# Patient Record
Sex: FEMALE | Race: WHITE | HISPANIC OR LATINO | URBAN - METROPOLITAN AREA
[De-identification: names, ages, dates, MRNs, and addresses within clinical notes are randomized per-mention and may not be internally consistent; named-entity substitution may affect disease eponyms.]

---

## 2024-05-20 ENCOUNTER — EMERGENCY (EMERGENCY)
Facility: HOSPITAL | Age: 34
LOS: 1 days | Discharge: HOME | End: 2024-05-20
Attending: EMERGENCY MEDICINE
Payer: SELF-PAY

## 2024-05-20 VITALS
OXYGEN SATURATION: 97 % | WEIGHT: 160.06 LBS | DIASTOLIC BLOOD PRESSURE: 97 MMHG | SYSTOLIC BLOOD PRESSURE: 140 MMHG | TEMPERATURE: 99 F | HEIGHT: 61 IN | RESPIRATION RATE: 17 BRPM | HEART RATE: 112 BPM

## 2024-05-20 LAB
ALBUMIN SERPL ELPH-MCNC: 4.4 G/DL — SIGNIFICANT CHANGE UP (ref 3.3–5)
ALP SERPL-CCNC: 101 U/L — SIGNIFICANT CHANGE UP (ref 40–120)
ALT FLD-CCNC: 39 U/L — SIGNIFICANT CHANGE UP (ref 10–45)
ANION GAP SERPL CALC-SCNC: 18 MMOL/L — HIGH (ref 5–17)
ANION GAP SERPL CALC-SCNC: 20 MMOL/L — HIGH (ref 5–17)
APTT BLD: 28.2 SEC — SIGNIFICANT CHANGE UP (ref 24.5–35.6)
AST SERPL-CCNC: 24 U/L — SIGNIFICANT CHANGE UP (ref 10–40)
BASOPHILS # BLD AUTO: 0.06 K/UL — SIGNIFICANT CHANGE UP (ref 0–0.2)
BASOPHILS NFR BLD AUTO: 0.6 % — SIGNIFICANT CHANGE UP (ref 0–2)
BILIRUB SERPL-MCNC: 0.5 MG/DL — SIGNIFICANT CHANGE UP (ref 0.2–1.2)
BUN SERPL-MCNC: 8 MG/DL — SIGNIFICANT CHANGE UP (ref 7–23)
BUN SERPL-MCNC: 9 MG/DL — SIGNIFICANT CHANGE UP (ref 7–23)
CALCIUM SERPL-MCNC: 9.5 MG/DL — SIGNIFICANT CHANGE UP (ref 8.4–10.5)
CALCIUM SERPL-MCNC: 9.7 MG/DL — SIGNIFICANT CHANGE UP (ref 8.4–10.5)
CHLORIDE SERPL-SCNC: 100 MMOL/L — SIGNIFICANT CHANGE UP (ref 96–108)
CHLORIDE SERPL-SCNC: 100 MMOL/L — SIGNIFICANT CHANGE UP (ref 96–108)
CO2 SERPL-SCNC: 18 MMOL/L — LOW (ref 22–31)
CO2 SERPL-SCNC: 18 MMOL/L — LOW (ref 22–31)
CREAT SERPL-MCNC: 0.71 MG/DL — SIGNIFICANT CHANGE UP (ref 0.5–1.3)
CREAT SERPL-MCNC: 0.73 MG/DL — SIGNIFICANT CHANGE UP (ref 0.5–1.3)
EGFR: 111 ML/MIN/1.73M2 — SIGNIFICANT CHANGE UP
EGFR: 114 ML/MIN/1.73M2 — SIGNIFICANT CHANGE UP
EOSINOPHIL # BLD AUTO: 0.03 K/UL — SIGNIFICANT CHANGE UP (ref 0–0.5)
EOSINOPHIL NFR BLD AUTO: 0.3 % — SIGNIFICANT CHANGE UP (ref 0–6)
GLUCOSE SERPL-MCNC: 101 MG/DL — HIGH (ref 70–99)
GLUCOSE SERPL-MCNC: 108 MG/DL — HIGH (ref 70–99)
HCG SERPL-ACNC: <2 MIU/ML — SIGNIFICANT CHANGE UP
HCT VFR BLD CALC: 40.9 % — SIGNIFICANT CHANGE UP (ref 34.5–45)
HGB BLD-MCNC: 13.5 G/DL — SIGNIFICANT CHANGE UP (ref 11.5–15.5)
IMM GRANULOCYTES NFR BLD AUTO: 0.3 % — SIGNIFICANT CHANGE UP (ref 0–0.9)
INR BLD: 1.04 RATIO — SIGNIFICANT CHANGE UP (ref 0.85–1.18)
LYMPHOCYTES # BLD AUTO: 1.6 K/UL — SIGNIFICANT CHANGE UP (ref 1–3.3)
LYMPHOCYTES # BLD AUTO: 15.2 % — SIGNIFICANT CHANGE UP (ref 13–44)
MCHC RBC-ENTMCNC: 28.8 PG — SIGNIFICANT CHANGE UP (ref 27–34)
MCHC RBC-ENTMCNC: 33 GM/DL — SIGNIFICANT CHANGE UP (ref 32–36)
MCV RBC AUTO: 87.4 FL — SIGNIFICANT CHANGE UP (ref 80–100)
MONOCYTES # BLD AUTO: 0.61 K/UL — SIGNIFICANT CHANGE UP (ref 0–0.9)
MONOCYTES NFR BLD AUTO: 5.8 % — SIGNIFICANT CHANGE UP (ref 2–14)
NEUTROPHILS # BLD AUTO: 8.2 K/UL — HIGH (ref 1.8–7.4)
NEUTROPHILS NFR BLD AUTO: 77.8 % — HIGH (ref 43–77)
NRBC # BLD: 0 /100 WBCS — SIGNIFICANT CHANGE UP (ref 0–0)
PLATELET # BLD AUTO: 383 K/UL — SIGNIFICANT CHANGE UP (ref 150–400)
POTASSIUM SERPL-MCNC: 3.4 MMOL/L — LOW (ref 3.5–5.3)
POTASSIUM SERPL-MCNC: 3.6 MMOL/L — SIGNIFICANT CHANGE UP (ref 3.5–5.3)
POTASSIUM SERPL-SCNC: 3.4 MMOL/L — LOW (ref 3.5–5.3)
POTASSIUM SERPL-SCNC: 3.6 MMOL/L — SIGNIFICANT CHANGE UP (ref 3.5–5.3)
PROT SERPL-MCNC: 8.3 G/DL — SIGNIFICANT CHANGE UP (ref 6–8.3)
PROTHROM AB SERPL-ACNC: 10.9 SEC — SIGNIFICANT CHANGE UP (ref 9.5–13)
RBC # BLD: 4.68 M/UL — SIGNIFICANT CHANGE UP (ref 3.8–5.2)
RBC # FLD: 13.8 % — SIGNIFICANT CHANGE UP (ref 10.3–14.5)
SODIUM SERPL-SCNC: 136 MMOL/L — SIGNIFICANT CHANGE UP (ref 135–145)
SODIUM SERPL-SCNC: 138 MMOL/L — SIGNIFICANT CHANGE UP (ref 135–145)
WBC # BLD: 10.53 K/UL — HIGH (ref 3.8–10.5)
WBC # FLD AUTO: 10.53 K/UL — HIGH (ref 3.8–10.5)

## 2024-05-20 PROCEDURE — 99285 EMERGENCY DEPT VISIT HI MDM: CPT

## 2024-05-20 PROCEDURE — 72170 X-RAY EXAM OF PELVIS: CPT | Mod: 26

## 2024-05-20 PROCEDURE — 73590 X-RAY EXAM OF LOWER LEG: CPT | Mod: 26,LT

## 2024-05-20 PROCEDURE — 73630 X-RAY EXAM OF FOOT: CPT | Mod: 26,LT

## 2024-05-20 PROCEDURE — 71045 X-RAY EXAM CHEST 1 VIEW: CPT | Mod: 26

## 2024-05-20 PROCEDURE — 73610 X-RAY EXAM OF ANKLE: CPT | Mod: 26,LT

## 2024-05-20 PROCEDURE — 73562 X-RAY EXAM OF KNEE 3: CPT | Mod: 26,LT

## 2024-05-20 PROCEDURE — 73610 X-RAY EXAM OF ANKLE: CPT | Mod: 26,LT,77

## 2024-05-20 PROCEDURE — 99253 IP/OBS CNSLTJ NEW/EST LOW 45: CPT

## 2024-05-20 PROCEDURE — 73600 X-RAY EXAM OF ANKLE: CPT | Mod: 26,LT

## 2024-05-20 PROCEDURE — 73590 X-RAY EXAM OF LOWER LEG: CPT | Mod: 26,LT,77

## 2024-05-20 RX ORDER — SODIUM CHLORIDE 9 MG/ML
1000 INJECTION, SOLUTION INTRAVENOUS ONCE
Refills: 0 | Status: COMPLETED | OUTPATIENT
Start: 2024-05-20 | End: 2024-05-20

## 2024-05-20 RX ORDER — OXYCODONE HYDROCHLORIDE 5 MG/1
5 TABLET ORAL ONCE
Refills: 0 | Status: DISCONTINUED | OUTPATIENT
Start: 2024-05-20 | End: 2024-05-20

## 2024-05-20 RX ORDER — POTASSIUM CHLORIDE 20 MEQ
40 PACKET (EA) ORAL ONCE
Refills: 0 | Status: COMPLETED | OUTPATIENT
Start: 2024-05-20 | End: 2024-05-20

## 2024-05-20 RX ORDER — ACETAMINOPHEN 500 MG
975 TABLET ORAL ONCE
Refills: 0 | Status: COMPLETED | OUTPATIENT
Start: 2024-05-20 | End: 2024-05-20

## 2024-05-20 RX ORDER — MORPHINE SULFATE 50 MG/1
4 CAPSULE, EXTENDED RELEASE ORAL ONCE
Refills: 0 | Status: DISCONTINUED | OUTPATIENT
Start: 2024-05-20 | End: 2024-05-20

## 2024-05-20 RX ORDER — ASPIRIN/CALCIUM CARB/MAGNESIUM 324 MG
325 TABLET ORAL ONCE
Refills: 0 | Status: COMPLETED | OUTPATIENT
Start: 2024-05-20 | End: 2024-05-20

## 2024-05-20 RX ORDER — TETANUS TOXOID, REDUCED DIPHTHERIA TOXOID AND ACELLULAR PERTUSSIS VACCINE, ADSORBED 5; 2.5; 8; 8; 2.5 [IU]/.5ML; [IU]/.5ML; UG/.5ML; UG/.5ML; UG/.5ML
0.5 SUSPENSION INTRAMUSCULAR ONCE
Refills: 0 | Status: COMPLETED | OUTPATIENT
Start: 2024-05-20 | End: 2024-05-20

## 2024-05-20 RX ORDER — SODIUM CHLORIDE 9 MG/ML
1000 INJECTION INTRAMUSCULAR; INTRAVENOUS; SUBCUTANEOUS ONCE
Refills: 0 | Status: DISCONTINUED | OUTPATIENT
Start: 2024-05-20 | End: 2024-05-20

## 2024-05-20 RX ADMIN — SODIUM CHLORIDE 1000 MILLILITER(S): 9 INJECTION, SOLUTION INTRAVENOUS at 19:26

## 2024-05-20 RX ADMIN — TETANUS TOXOID, REDUCED DIPHTHERIA TOXOID AND ACELLULAR PERTUSSIS VACCINE, ADSORBED 0.5 MILLILITER(S): 5; 2.5; 8; 8; 2.5 SUSPENSION INTRAMUSCULAR at 17:49

## 2024-05-20 RX ADMIN — Medication 975 MILLIGRAM(S): at 16:54

## 2024-05-20 RX ADMIN — SODIUM CHLORIDE 1000 MILLILITER(S): 9 INJECTION, SOLUTION INTRAVENOUS at 22:12

## 2024-05-20 RX ADMIN — Medication 325 MILLIGRAM(S): at 19:26

## 2024-05-20 RX ADMIN — OXYCODONE HYDROCHLORIDE 5 MILLIGRAM(S): 5 TABLET ORAL at 19:26
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
## 2024-05-20 NOTE — CONSULT NOTE ADULT - SUBJECTIVE AND OBJECTIVE BOX
Orthopedic Surgery Consult Note    34yFemale p/w L ankle pain/deformity and inability to bear weight s/p mechanical fall on Saturday. Patient has not been ambulatory since the fall, with assistance at home with family. Denies headstrike/LOC. Denies numbness/tingling in the feet/toes. No other bone or joint complaints.                           13.5   10.53 )-----------( 383      ( 20 May 2024 16:59 )             40.9     20 May 2024 16:59    136    |  100    |  9      ----------------------------<  108    3.6     |  18     |  0.73     Ca    9.7        20 May 2024 16:59    TPro  8.3    /  Alb  4.4    /  TBili  0.5    /  DBili  x      /  AST  24     /  ALT  39     /  AlkPhos  101    20 May 2024 16:59    PT/INR - ( 20 May 2024 16:59 )   PT: 10.9 sec;   INR: 1.04 ratio         PTT - ( 20 May 2024 16:59 )  PTT:28.2 sec  Vital Signs Last 24 Hrs  T(C): 37.2 (05-20-24 @ 14:43), Max: 37.2 (05-20-24 @ 14:43)  T(F): 99 (05-20-24 @ 14:43), Max: 99 (05-20-24 @ 14:43)  HR: 112 (05-20-24 @ 14:43) (112 - 112)  BP: 140/97 (05-20-24 @ 14:43) (140/97 - 140/97)  BP(mean): --  RR: 17 (05-20-24 @ 14:43) (17 - 17)  SpO2: 97% (05-20-24 @ 14:43) (97% - 97%)    Physical Exam  Gen: NAD  Diffusely swollen, edematous, ecchymosis noted, skin intact otherwise  LLE: Skin intact, No skin tenting, +TTP lateral malleolus  motor intact distally  SILT s/s/sp/dp/t  2+ DP    Secondary Assessment:  NC/AT, NTTP of clavicles, NTTP of C-,T-,L-Spine, NTTP of Pelvis  UEs: NTTP of Shoulders, Elbows, Wrists, Hands; NT with AROM/PROM of Shoulders, Elbows, Wrists, Hands; AIN/PIN/Med/Uln/Msc/Rad/Ax intact  RLE: Able to SLR, NT with Log Roll, NT with Heel Strike, NTTP of Hip, Knee, Ankle, Foot; NT with AROM/PROM of Hip, Knee, Ankle, Foot; Q/H/Gsc/TA/EHL/FHL intact     Imaging:  XR showing minimally displaced L Oakes B ankle fracture, does not open on manual stress view (personal read).     Procedure: Closed reduction performed followed by placement of a well padded trilam splint. Patient tolerated the procedure well. Post procedure imaging obtained and showed improved alignment. Post procedure the patient was NV intact.    A/P: 34yFemale with L Oakes B ankle fracture s/p closed reduction and splinting  - Pain control  - NWB on affected extremity in splint  - Keep splint clean, dry and intact until follow up  - Cane/crutches/walker as needed  - Ice/elevation  - ASA qD for 7-14 days for DVT ppx  - Patient counseled on signs and symptoms of compartment syndrome and instructed on when to return to ED  - Patient counseled on possible need for operative intervention  - Follow up with Dr. Drummond in 1 week, call office for appointment   Orthopedic Surgery Consult Note    34yFemale p/w L ankle pain/deformity and inability to bear weight s/p mechanical fall on Saturday. Patient has not been ambulatory since the fall, with assistance at home with family. Denies headstrike/LOC. Denies numbness/tingling in the feet/toes. No other bone or joint complaints.                           13.5   10.53 )-----------( 383      ( 20 May 2024 16:59 )             40.9     20 May 2024 16:59    136    |  100    |  9      ----------------------------<  108    3.6     |  18     |  0.73     Ca    9.7        20 May 2024 16:59    TPro  8.3    /  Alb  4.4    /  TBili  0.5    /  DBili  x      /  AST  24     /  ALT  39     /  AlkPhos  101    20 May 2024 16:59    PT/INR - ( 20 May 2024 16:59 )   PT: 10.9 sec;   INR: 1.04 ratio         PTT - ( 20 May 2024 16:59 )  PTT:28.2 sec  Vital Signs Last 24 Hrs  T(C): 37.2 (05-20-24 @ 14:43), Max: 37.2 (05-20-24 @ 14:43)  T(F): 99 (05-20-24 @ 14:43), Max: 99 (05-20-24 @ 14:43)  HR: 112 (05-20-24 @ 14:43) (112 - 112)  BP: 140/97 (05-20-24 @ 14:43) (140/97 - 140/97)  BP(mean): --  RR: 17 (05-20-24 @ 14:43) (17 - 17)  SpO2: 97% (05-20-24 @ 14:43) (97% - 97%)    Physical Exam  Gen: NAD  Diffusely swollen, edematous, ecchymosis noted, skin intact otherwise  LLE: Skin intact, No skin tenting, +TTP lateral malleolus  motor intact distally  SILT s/s/sp/dp/t  2+ DP    Secondary Assessment:  NC/AT, NTTP of clavicles, NTTP of C-,T-,L-Spine, NTTP of Pelvis  UEs: NTTP of Shoulders, Elbows, Wrists, Hands; NT with AROM/PROM of Shoulders, Elbows, Wrists, Hands; AIN/PIN/Med/Uln/Msc/Rad/Ax intact  RLE: Able to SLR, NT with Log Roll, NT with Heel Strike, NTTP of Hip, Knee, Ankle, Foot; NT with AROM/PROM of Hip, Knee, Ankle, Foot; Q/H/Gsc/TA/EHL/FHL intact     Imaging:  XR showing minimally displaced L Oakes B ankle fracture (personal read).     Procedure: Closed reduction performed followed by placement of a well padded trilam splint. Patient tolerated the procedure well. Post procedure imaging obtained and showed improved alignment. Post procedure the patient was NV intact.    A/P: 34yFemale with L Oakes B ankle fracture s/p closed reduction and splinting  - Pain control  - NWB on affected extremity in splint  - Keep splint clean, dry and intact until follow up  - Cane/crutches/walker as needed  - Ice/elevation  - Pt refused repeat stress view to assess for opening vs stability  - ASA qD for 7-14 days for DVT ppx  - Patient counseled on signs and symptoms of compartment syndrome and instructed on when to return to ED  - Patient counseled on possible need for operative intervention  - Follow up with Dr. Drummond in 1 week, call office for appointment
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
## 2024-05-20 NOTE — ED PROVIDER NOTE - PATIENT PORTAL LINK FT
You can access the FollowMyHealth Patient Portal offered by [facility] by registering at the following website: [email or web address]. By joining [facility]’s FollowMyHealth portal, you will also be able to view your health information using other applications (apps) compatible with our system.
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
## 2024-05-20 NOTE — ED ADULT NURSE NOTE - NSFALLRISKINTERV_ED_ALL_ED
Assistance OOB with selected safe patient handling equipment if applicable/Assistance with ambulation/Communicate fall risk and risk factors to all staff, patient, and family/Monitor gait and stability/Provide visual cue: yellow wristband, yellow gown, etc/Reinforce activity limits and safety measures with patient and family/Call bell, personal items and telephone in reach/Instruct patient to call for assistance before getting out of bed/chair/stretcher/Non-slip footwear applied when patient is off stretcher/[place] to call system/Physically safe environment - no spills, clutter or unnecessary equipment/Purposeful Proactive Rounding/Room/bathroom lighting operational, light cord in reach

## 2024-05-20 NOTE — ED PROVIDER NOTE - NSFOLLOWUPINSTRUCTIONS_ED_ALL_ED_FT
1. Follow up with your PCP within 2-3 days.   2. Follow up with orthopedic Dr. Goldberg within 1 week. Call to schedule your appointment.   3. Keep splint clean and dry. Do NOT wet.   4. Use crutches as needed to ambulate. You may NOT bear weight on the ankle.   5. Keep leg elevated. Use ice.   6. Take Ibuprofen (i.e. Motrin, Advil) 600mg every 8 hrs for pain as needed. Take with food.   May alternate with Acetaminophen (Tylenol) 650mg every 6 hours for pain as needed.   7. Return to the emergency department if you have worsening pain, swelling, numbness, color changes to toes, fevers or all other concerning symptoms. 1. Follow up with your PCP within 2-3 days. While here, your potassium was low, please try to eat some foods high in potassium, like bananas to help with this.  2. Follow up with orthopedic Dr. Goldberg within 1 week. Call to schedule your appointment.   3. Keep splint clean and dry. Do NOT wet.   4. Use crutches as needed to ambulate. You may NOT bear weight on the ankle.   5. Keep leg elevated. Use ice.   6. Take Ibuprofen (i.e. Motrin, Advil) 600mg every 8 hrs for pain as needed. Take with food.   May alternate with Acetaminophen (Tylenol) 650mg every 6 hours for pain as needed.   7. Return to the emergency department if you have worsening pain, swelling, numbness, color changes to toes, fevers or all other concerning symptoms. 1. Follow up with your PCP within 2-3 days. While here, your potassium was low, please try to eat some foods high in potassium, like bananas to help with this.  2. Follow up with orthopedic Dr. Goldberg within 1 week. Call to schedule your appointment.   3. Keep splint clean and dry. Do NOT wet.   4. Use crutches as needed to ambulate. You may NOT bear weight on the ankle.   5. Keep leg elevated. Use ice.   6. Take Aspirin 325mg daily.    7.  You may take Acetaminophen (Tylenol) 650mg every 6 hours for pain as needed. Do not exceed 4000mg of Tylenol in a 24 hour period.  8. Return to the emergency department if you have worsening pain, swelling, numbness, color changes to toes, fevers or all other concerning symptoms.

## 2024-05-20 NOTE — ED PROVIDER NOTE - PROGRESS NOTE DETAILS
Attending MD Eckert: Splinted by ortho, patient to be started on  mg daily, will give first dose here.  Cancelled Morphine, will give po Oxycodone. Will give IVFs.  Patient is to follow up with Dr. Drummond and be NWB and use crutches. Attending MD Eckert: After discharge, noted patient's discharge instructions did not include Aspirin 325mg daily.  Call placed to [phone] and informed patient that she needed to take Aspirin 325mg daily (NOT 81mg).  Patient verbalized understanding and repeated back to this MD.

## 2024-05-20 NOTE — ED PROVIDER NOTE - PHYSICAL EXAMINATION
CONSTITUTIONAL: Patient is awake, alert and oriented x 3. Patient is well appearing and in no acute distress.  HEAD: NCAT  ENT: Airway patent, Nasal mucosa clear.   NECK: Supple  LUNGS: CTA B/L,   HEART: RRR.+S1S2   ABDOMEN: Soft, non-tender to palpation throughout all four quadrants,   MSK: unable to flex/extend left ankle due to pain, distal pulses intact, sensation intact, brisk capillary refill, edema to left foot/ankle with ttp over lateral malleolus   SKIN: ecchymosis in various stages of healing throughout body, multiple abrasions noted to body; large area of ecchymosis to left inner thigh   NEURO: No focal deficits ,Sensation intact;
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
## 2024-05-21 VITALS
SYSTOLIC BLOOD PRESSURE: 152 MMHG | DIASTOLIC BLOOD PRESSURE: 90 MMHG | RESPIRATION RATE: 18 BRPM | OXYGEN SATURATION: 97 % | TEMPERATURE: 98 F | HEART RATE: 72 BPM

## 2024-05-21 LAB
ANION GAP SERPL CALC-SCNC: 15 MMOL/L — SIGNIFICANT CHANGE UP (ref 5–17)
BUN SERPL-MCNC: 8 MG/DL — SIGNIFICANT CHANGE UP (ref 7–23)
CALCIUM SERPL-MCNC: 8.8 MG/DL — SIGNIFICANT CHANGE UP (ref 8.4–10.5)
CHLORIDE SERPL-SCNC: 101 MMOL/L — SIGNIFICANT CHANGE UP (ref 96–108)
CO2 SERPL-SCNC: 21 MMOL/L — LOW (ref 22–31)
CREAT SERPL-MCNC: 0.68 MG/DL — SIGNIFICANT CHANGE UP (ref 0.5–1.3)
EGFR: 117 ML/MIN/1.73M2 — SIGNIFICANT CHANGE UP
GLUCOSE SERPL-MCNC: 87 MG/DL — SIGNIFICANT CHANGE UP (ref 70–99)
POTASSIUM SERPL-MCNC: 3.4 MMOL/L — LOW (ref 3.5–5.3)
POTASSIUM SERPL-SCNC: 3.4 MMOL/L — LOW (ref 3.5–5.3)
SODIUM SERPL-SCNC: 137 MMOL/L — SIGNIFICANT CHANGE UP (ref 135–145)

## 2024-05-21 PROCEDURE — 84702 CHORIONIC GONADOTROPIN TEST: CPT

## 2024-05-21 PROCEDURE — 72170 X-RAY EXAM OF PELVIS: CPT

## 2024-05-21 PROCEDURE — 71045 X-RAY EXAM CHEST 1 VIEW: CPT

## 2024-05-21 PROCEDURE — 73610 X-RAY EXAM OF ANKLE: CPT

## 2024-05-21 PROCEDURE — 80053 COMPREHEN METABOLIC PANEL: CPT

## 2024-05-21 PROCEDURE — 90471 IMMUNIZATION ADMIN: CPT

## 2024-05-21 PROCEDURE — 99285 EMERGENCY DEPT VISIT HI MDM: CPT | Mod: 25

## 2024-05-21 PROCEDURE — 80048 BASIC METABOLIC PNL TOTAL CA: CPT

## 2024-05-21 PROCEDURE — 85025 COMPLETE CBC W/AUTO DIFF WBC: CPT

## 2024-05-21 PROCEDURE — 85730 THROMBOPLASTIN TIME PARTIAL: CPT

## 2024-05-21 PROCEDURE — 73630 X-RAY EXAM OF FOOT: CPT

## 2024-05-21 PROCEDURE — 73590 X-RAY EXAM OF LOWER LEG: CPT

## 2024-05-21 PROCEDURE — 85610 PROTHROMBIN TIME: CPT

## 2024-05-21 PROCEDURE — 27788 TREATMENT OF ANKLE FRACTURE: CPT | Mod: LT

## 2024-05-21 PROCEDURE — 90715 TDAP VACCINE 7 YRS/> IM: CPT

## 2024-05-21 PROCEDURE — 73562 X-RAY EXAM OF KNEE 3: CPT

## 2024-05-21 PROCEDURE — 73600 X-RAY EXAM OF ANKLE: CPT

## 2024-05-21 PROCEDURE — 36000 PLACE NEEDLE IN VEIN: CPT | Mod: XU

## 2024-05-21 RX ADMIN — Medication 40 MILLIEQUIVALENT(S): at 00:07

## 2024-05-21 NOTE — ED POST DISCHARGE NOTE - RESULT SUMMARY
incidental/recommended f/u list; Pt evaluated by ortho for Xray findings and placed in splint, given oupt f/u. - Nahum Fonseca PA-C